# Patient Record
Sex: FEMALE | Race: BLACK OR AFRICAN AMERICAN | Employment: FULL TIME
[De-identification: names, ages, dates, MRNs, and addresses within clinical notes are randomized per-mention and may not be internally consistent; named-entity substitution may affect disease eponyms.]

---

## 2017-01-10 ENCOUNTER — OFFICE VISIT (OUTPATIENT)
Dept: INTERNAL MEDICINE | Facility: CLINIC | Age: 47
End: 2017-01-10

## 2017-01-10 VITALS
BODY MASS INDEX: 44.63 KG/M2 | HEART RATE: 68 BPM | OXYGEN SATURATION: 98 % | SYSTOLIC BLOOD PRESSURE: 145 MMHG | WEIGHT: 261.4 LBS | HEIGHT: 64 IN | DIASTOLIC BLOOD PRESSURE: 93 MMHG | RESPIRATION RATE: 12 BRPM

## 2017-01-10 DIAGNOSIS — M54.31 SCIATICA, RIGHT SIDE: Primary | ICD-10-CM

## 2017-01-10 DIAGNOSIS — G47.33 OBSTRUCTIVE SLEEP APNEA SYNDROME: ICD-10-CM

## 2017-01-10 PROCEDURE — 99214 OFFICE O/P EST MOD 30 MIN: CPT | Performed by: INTERNAL MEDICINE

## 2017-01-10 RX ORDER — NEBULIZER ACCESSORIES
EACH MISCELLANEOUS
Qty: 1 EACH | Refills: 0 | Status: SHIPPED | OUTPATIENT
Start: 2017-01-10

## 2017-07-25 ENCOUNTER — TELEPHONE (OUTPATIENT)
Dept: BARIATRICS/WEIGHT MGMT | Age: 47
End: 2017-07-25

## 2018-01-10 ENCOUNTER — OFFICE VISIT (OUTPATIENT)
Dept: FAMILY MEDICINE CLINIC | Age: 48
End: 2018-01-10
Payer: COMMERCIAL

## 2018-01-10 VITALS
TEMPERATURE: 98.5 F | OXYGEN SATURATION: 98 % | SYSTOLIC BLOOD PRESSURE: 155 MMHG | BODY MASS INDEX: 48.37 KG/M2 | HEART RATE: 92 BPM | WEIGHT: 273 LBS | DIASTOLIC BLOOD PRESSURE: 84 MMHG | HEIGHT: 63 IN | RESPIRATION RATE: 16 BRPM

## 2018-01-10 DIAGNOSIS — J06.0 SORE THROAT AND LARYNGITIS: Primary | ICD-10-CM

## 2018-01-10 LAB — S PYO AG THROAT QL: NORMAL

## 2018-01-10 PROCEDURE — 87880 STREP A ASSAY W/OPTIC: CPT | Performed by: FAMILY MEDICINE

## 2018-01-10 PROCEDURE — 99214 OFFICE O/P EST MOD 30 MIN: CPT | Performed by: FAMILY MEDICINE

## 2018-01-10 RX ORDER — METHYLPREDNISOLONE 4 MG/1
TABLET ORAL
Qty: 1 KIT | Refills: 0 | Status: SHIPPED | OUTPATIENT
Start: 2018-01-10 | End: 2018-01-24 | Stop reason: ALTCHOICE

## 2018-01-10 RX ORDER — FLUTICASONE PROPIONATE 50 MCG
1 SPRAY, SUSPENSION (ML) NASAL 2 TIMES DAILY
Qty: 1 BOTTLE | Refills: 2 | Status: SHIPPED | OUTPATIENT
Start: 2018-01-10 | End: 2018-01-24

## 2018-01-10 RX ORDER — AMOXICILLIN AND CLAVULANATE POTASSIUM 875; 125 MG/1; MG/1
1 TABLET, FILM COATED ORAL 2 TIMES DAILY
Qty: 20 TABLET | Refills: 0 | Status: SHIPPED | OUTPATIENT
Start: 2018-01-10 | End: 2018-01-20

## 2018-01-10 RX ORDER — BROMPHENIRAMINE MALEATE, PSEUDOEPHEDRINE HYDROCHLORIDE, AND DEXTROMETHORPHAN HYDROBROMIDE 2; 30; 10 MG/5ML; MG/5ML; MG/5ML
5 SYRUP ORAL 3 TIMES DAILY
Qty: 180 ML | Refills: 0 | Status: SHIPPED | OUTPATIENT
Start: 2018-01-10

## 2018-01-10 ASSESSMENT — ENCOUNTER SYMPTOMS
ALLERGIC/IMMUNOLOGIC NEGATIVE: 1
EYES NEGATIVE: 1
SORE THROAT: 1
SINUS PAIN: 1
GASTROINTESTINAL NEGATIVE: 1
RHINORRHEA: 1
COUGH: 1
SINUS PRESSURE: 1

## 2018-01-10 NOTE — PROGRESS NOTES
SLEEVE GASTRECTOMY  7/21/2014    robotic    TUBAL LIGATION  2003       Family History   Problem Relation Age of Onset    High Blood Pressure Mother     Cancer Maternal Grandmother     Heart Disease Paternal Grandmother     High Cholesterol Paternal Grandmother     Stroke Paternal Grandmother     Thyroid Disease Mother        Social History   Substance Use Topics    Smoking status: Never Smoker    Smokeless tobacco: Never Used    Alcohol use No      Current Outpatient Prescriptions   Medication Sig Dispense Refill    amoxicillin-clavulanate (AUGMENTIN) 875-125 MG per tablet Take 1 tablet by mouth 2 times daily for 10 days 20 tablet 0    fluticasone (FLONASE) 50 MCG/ACT nasal spray 1 spray by Nasal route 2 times daily for 14 days 1 Bottle 2    methylPREDNISolone (MEDROL, MAMIE,) 4 MG tablet By mouth. 1 kit 0    brompheniramine-pseudoephedrine-DM 30-2-10 MG/5ML syrup Take 5 mLs by mouth 3 times daily 180 mL 0    Respiratory Therapy Supplies (ADULT MASK) MISC Nasal and oral mask for CPAP machine. With pillow cushioning. 1 each 0    pimecrolimus (ELIDEL) 1 % cream Apply topically 2 times daily. 1 Tube 5    Multiple Vitamin (MVI, BARIATRIC ADVANTAGE MULTI-FORMULA, CHEW TAB) Take 1 tablet by mouth 2 times daily. Separate iron       No current facility-administered medications for this visit. Allergies   Allergen Reactions    Lisinopril      coughing    Seasonal Itching       Health Maintenance   Topic Date Due    HIV screen  06/03/1983    DTaP/Tdap/Td vaccine (1 - Tdap) 06/03/1987    Cervical cancer screen  06/03/1989    A1C test (Diabetic or Prediabetic)  06/25/2015    Flu vaccine (1) 09/01/2017    Lipid screen  06/25/2019       Subjective:      Review of Systems   Constitutional: Positive for chills, diaphoresis and fatigue. HENT: Positive for congestion, postnasal drip, rhinorrhea, sinus pain, sinus pressure and sore throat. Eyes: Negative. Respiratory: Positive for cough.

## 2018-01-24 ENCOUNTER — HOSPITAL ENCOUNTER (OUTPATIENT)
Age: 48
Setting detail: SPECIMEN
Discharge: HOME | End: 2018-01-24
Payer: COMMERCIAL

## 2018-01-24 ENCOUNTER — OFFICE VISIT (OUTPATIENT)
Dept: FAMILY MEDICINE CLINIC | Age: 48
End: 2018-01-24
Payer: COMMERCIAL

## 2018-01-24 VITALS
HEART RATE: 88 BPM | TEMPERATURE: 98 F | OXYGEN SATURATION: 98 % | BODY MASS INDEX: 46.61 KG/M2 | WEIGHT: 273 LBS | RESPIRATION RATE: 16 BRPM | HEIGHT: 64 IN | DIASTOLIC BLOOD PRESSURE: 92 MMHG | SYSTOLIC BLOOD PRESSURE: 144 MMHG

## 2018-01-24 DIAGNOSIS — J06.0 SORE THROAT AND LARYNGITIS: Primary | ICD-10-CM

## 2018-01-24 LAB — S PYO AG THROAT QL: NORMAL

## 2018-01-24 PROCEDURE — 99214 OFFICE O/P EST MOD 30 MIN: CPT | Performed by: FAMILY MEDICINE

## 2018-01-24 PROCEDURE — 87880 STREP A ASSAY W/OPTIC: CPT | Performed by: FAMILY MEDICINE

## 2018-01-24 ASSESSMENT — ENCOUNTER SYMPTOMS
EYES NEGATIVE: 1
RESPIRATORY NEGATIVE: 1
GASTROINTESTINAL NEGATIVE: 1
SORE THROAT: 1
ALLERGIC/IMMUNOLOGIC NEGATIVE: 1
SINUS PAIN: 1
SINUS PRESSURE: 1

## 2018-01-24 NOTE — PROGRESS NOTES
[address] [facility]   [address] [facility]  [address] [facility] [address]  Dept: [phone]  Dept Fax: [phone]    Samreen Jones is a 50 y.o. female who presents today for her medical conditions/complaints as noted below. Samreen Jones is c/o of   Chief Complaint   Patient presents with    Pharyngitis     finished antibiotic on sunday, on tuesday got the sore throat again         HPI:     This patient is a 58-year-old black female with a past medical history significant for arthritis, depression, hypertension, and obesity. Patient is currently on a CPAP machine. She presents today for continued sore throat and painful swallowing. Her pain scale is 8 out of 10 sharp with radiations to the posterior throat. Patient also states that she feels as if her neck is swollen. She just completed a 10 day course of Augmentin. We will evaluate and treat.         Hemoglobin A1C (%)   Date Value   06/25/2014 6.0             ( goal A1C is < 7)   No results found for: LABMICR  LDL Cholesterol (mg/dL)   Date Value   06/25/2014 144 (H)       (goal LDL is <100)   AST (U/L)   Date Value   06/25/2014 18     ALT (U/L)   Date Value   06/25/2014 18     BUN (mg/dL)   Date Value   06/25/2014 10     BP Readings from Last 3 Encounters:   01/24/18 (!) 144/92   01/10/18 (!) 155/84   01/10/17 (!) 145/93          (goal 120/80)    Past Medical History:   Diagnosis Date    Arthritis     Dental crown present     upper front    Depression     Hypertension     Obesity     YESENIA on CPAP     CPAP    S/P laparoscopic sleeve gastrectomy 7/21/14    pre op wt 316lb    Urinary incontinence     Wears glasses       Past Surgical History:   Procedure Laterality Date    PLANTAR FASCIA SURGERY Left 2005    [facility]    SLEEVE GASTRECTOMY  7/21/2014    robotic    TUBAL LIGATION  2003       Family History   Problem Relation Age of Onset    High Blood Pressure Mother     Cancer HENT:   Head: Normocephalic and atraumatic. Right Ear: External ear normal.   Left Ear: External ear normal.   Nose: Nose normal.   Mouth/Throat: Oropharyngeal exudate, posterior oropharyngeal edema and posterior oropharyngeal erythema present. Eyes: Conjunctivae and EOM are normal. Pupils are equal, round, and reactive to light. Neck: Normal range of motion. Neck supple. Cardiovascular: Normal rate, regular rhythm, normal heart sounds and intact distal pulses. Pulmonary/Chest: Effort normal.   Abdominal: Soft. Bowel sounds are normal.   Musculoskeletal: Normal range of motion. Lymphadenopathy:        Head (right side): Submandibular adenopathy present. Head (left side): Submandibular adenopathy present. Neurological: She is alert and oriented to person, place, and time. She has normal reflexes. Skin: Skin is warm and dry. Psychiatric: She has a normal mood and affect. Her behavior is normal. Judgment and thought content normal.   Nursing note and vitals reviewed. BP (!) 144/92 (Site: Left Arm, Position: Sitting, Cuff Size: Large Adult)   Pulse 88   Temp 98 °F (36.7 °C) (Oral)   Resp 16   Ht 5' 4\" (1.626 m)   Wt 273 lb (123.8 kg)   SpO2 98%   BMI 46.86 kg/m²     Assessment:      1. Sore throat and laryngitis  POCT rapid strep A    Strep A DNA probe, amplification             Plan:      Return if symptoms worsen or fail to improve. We discussed viral syndrome. Patient states that her symptoms has returned after she finished the Augmentin. This time we will repeat a strep and do a strep culture. No antibiotics until strep culture shows a treatable organism. Continue ibuprofen and/or Tylenol when necessary pain. Labs pending. Orders Placed This Encounter   Procedures    Strep A DNA probe, amplification     Standing Status:   Future     Standing Expiration Date:   1/24/2019    POCT rapid strep A     No orders of the defined types were placed in this encounter.       Patient

## 2018-01-24 NOTE — PROGRESS NOTES
[address] [facility]   [address] [address]  Dept: [phone]  Dept Fax: [phone]    Millie Oneill is a 50 y.o. female who presents today for her medical conditions/complaints as noted below.   Millie Oneill is c/o of   Chief Complaint   Patient presents with    Pharyngitis     finished antibiotic on sunday, on tuesday got the sore throat again         HPI:     HPI    Hemoglobin A1C (%)   Date Value   06/25/2014 6.0             ( goal A1C is < 7)   No results found for: LABMICR  LDL Cholesterol (mg/dL)   Date Value   06/25/2014 144 (H)       (goal LDL is <100)   AST (U/L)   Date Value   06/25/2014 18     ALT (U/L)   Date Value   06/25/2014 18     BUN (mg/dL)   Date Value   06/25/2014 10     BP Readings from Last 3 Encounters:   01/24/18 (!) 144/92   01/10/18 (!) 155/84   01/10/17 (!) 145/93          (goal 120/80)    Past Medical History:   Diagnosis Date    Arthritis     Dental crown present     upper front    Depression     Hypertension     Obesity     YESENIA on CPAP     CPAP    S/P laparoscopic sleeve gastrectomy 7/21/14    pre op wt 316lb    Urinary incontinence     Wears glasses       Past Surgical History:   Procedure Laterality Date    PLANTAR FASCIA SURGERY Left 2005    [facility]    SLEEVE GASTRECTOMY  7/21/2014    robotic    TUBAL LIGATION  2003       Family History   Problem Relation Age of Onset    High Blood Pressure Mother     Cancer Maternal Grandmother     Heart Disease Paternal Grandmother     High Cholesterol Paternal Grandmother     Stroke Paternal Grandmother     Thyroid Disease Mother        Social History   Substance Use Topics    Smoking status: Never Smoker    Smokeless tobacco: Never Used    Alcohol use No      Current Outpatient Prescriptions   Medication Sig Dispense Refill    fluticasone (FLONASE) 50 MCG/ACT nasal spray 1 spray by Nasal route 2 times daily for 14 days 1 Bottle not just shotgun with a bunch of antibiotics. The patient insisted on an antibiotic which I did not prescribed. She complains of pain which at that time I recommended to continue Tylenol and/or ibuprofen. We suggested to continue Chloraseptic spray in the warm salty gargles as tolerated. The patient seemed to be a little unhappy. The patient works as a hospital . Orders Placed This Encounter   Procedures    Strep A DNA probe, amplification     Standing Status:   Future     Standing Expiration Date:   1/24/2019    POCT rapid strep A     No orders of the defined types were placed in this encounter. Patient given educational materials - see patient instructions. Discussed use, benefit, and side effects of prescribed medications. All patient questions answered. Pt voiced understanding. Reviewed health maintenance. Instructed to continue current medications, diet and exercise. Patient agreed with treatment plan. Follow up as directed.      Electronically signed by Cheryle Hawk, MD on 1/24/2018 at 11:46 AM

## 2018-02-13 DIAGNOSIS — J04.0 LARYNGITIS: Primary | ICD-10-CM

## 2018-03-07 ENCOUNTER — OFFICE VISIT (OUTPATIENT)
Dept: OTOLARYNGOLOGY | Age: 48
End: 2018-03-07
Payer: COMMERCIAL

## 2018-03-07 DIAGNOSIS — K21.9 GASTROESOPHAGEAL REFLUX DISEASE WITHOUT ESOPHAGITIS: Primary | ICD-10-CM

## 2018-03-07 DIAGNOSIS — H61.23 BILATERAL IMPACTED CERUMEN: ICD-10-CM

## 2018-03-07 PROCEDURE — 99203 OFFICE O/P NEW LOW 30 MIN: CPT | Performed by: OTOLARYNGOLOGY

## 2018-03-07 NOTE — PROGRESS NOTES
Department of Otolaryngology    Assesment/Plan:  Reflux laryngitis- omeprazole qd, stop caffeine, cerumen impaction of the ears- was with hydrogen peroxide    CHIEF COMPLAINT:  Hoarseness, ears full    HISTORY OF PRESENT ILLNESS:              Boo Hall  is a 50 y.o. female with frequent bouts of hoarseness. Has recurring cough and ear pain. Tried steroids and antibiotics with some relief. She is overweight. Drinks 2 cups of coffee per day (20 oz). Also feels that ears are filled with wax. Past Medical History:        Diagnosis Date    Arthritis     Dental crown present     upper front    Depression     Hypertension     Obesity     YESENIA on CPAP     CPAP    S/P laparoscopic sleeve gastrectomy 7/21/14    pre op wt 316lb    Urinary incontinence     Wears glasses      Past Surgical History:        Procedure Laterality Date    PLANTAR FASCIA SURGERY Left 2005    [facility]    SLEEVE GASTRECTOMY  7/21/2014    robotic    TUBAL LIGATION  2003     Current Medications:   No current facility-administered medications for this visit.    Allergies:  Lisinopril and Seasonal    Social History:    Social History     Social History    Marital status:      Spouse name: N/A    Number of children: N/A    Years of education: N/A     Social History Main Topics    Smoking status: Never Smoker    Smokeless tobacco: Never Used    Alcohol use No    Drug use: No    Sexual activity: Not on file     Other Topics Concern    Not on file     Social History Narrative    No narrative on file       Family History:        Problem Relation Age of Onset    High Blood Pressure Mother     Cancer Maternal Grandmother     Heart Disease Paternal Grandmother     High Cholesterol Paternal Grandmother     Stroke Paternal Grandmother     Thyroid Disease Mother        REVIEW OF SYSTEMS:  As above and:  CONSTITUTIONAL:  negative  EYES:  negative   HEENT:  negative   RESPIRATORY:  negative   CARDIOVASCULAR:

## 2018-07-24 ENCOUNTER — TELEPHONE (OUTPATIENT)
Dept: BARIATRICS/WEIGHT MGMT | Age: 48
End: 2018-07-24

## 2019-07-29 ENCOUNTER — TELEPHONE (OUTPATIENT)
Dept: BARIATRICS/WEIGHT MGMT | Age: 49
End: 2019-07-29

## 2020-07-14 ENCOUNTER — TELEPHONE (OUTPATIENT)
Dept: BARIATRICS/WEIGHT MGMT | Age: 50
End: 2020-07-14

## 2021-08-09 ENCOUNTER — TELEPHONE (OUTPATIENT)
Dept: BARIATRICS/WEIGHT MGMT | Age: 51
End: 2021-08-09